# Patient Record
Sex: MALE | Race: WHITE | NOT HISPANIC OR LATINO | URBAN - METROPOLITAN AREA
[De-identification: names, ages, dates, MRNs, and addresses within clinical notes are randomized per-mention and may not be internally consistent; named-entity substitution may affect disease eponyms.]

---

## 2018-08-06 ENCOUNTER — OFFICE VISIT - HEALTHEAST (OUTPATIENT)
Dept: FAMILY MEDICINE | Facility: CLINIC | Age: 33
End: 2018-08-06

## 2018-08-06 DIAGNOSIS — J32.9 SINUSITIS: ICD-10-CM

## 2018-08-06 ASSESSMENT — MIFFLIN-ST. JEOR: SCORE: 1899.08

## 2018-08-22 ENCOUNTER — COMMUNICATION - HEALTHEAST (OUTPATIENT)
Dept: FAMILY MEDICINE | Facility: CLINIC | Age: 33
End: 2018-08-22

## 2018-08-24 ENCOUNTER — OFFICE VISIT - HEALTHEAST (OUTPATIENT)
Dept: FAMILY MEDICINE | Facility: CLINIC | Age: 33
End: 2018-08-24

## 2018-08-24 DIAGNOSIS — J01.01 ACUTE RECURRENT MAXILLARY SINUSITIS: ICD-10-CM

## 2021-06-01 VITALS — WEIGHT: 196.06 LBS | HEIGHT: 74 IN | BODY MASS INDEX: 25.16 KG/M2

## 2021-06-01 VITALS — BODY MASS INDEX: 26.1 KG/M2 | WEIGHT: 203.3 LBS

## 2021-06-19 NOTE — PROGRESS NOTES
DATE OF SERVICE: 08/06/2018    SUBJECTIVE:  A very pleasant 32-year-old male new to this clinic presents today for  a cough with nasal congestion and mucus for 3 weeks in duration of moderate  intensity.  This is accompanied by mild pain and fullness in both maxillary sinuses.   The patient notes that he has  also noted occasionally he gets short of breath and  he is coughing a fair amount.    REVIEW OF SYSTEMS:  All 12 systems were reviewed and are negative except for past  childhood history of asthma.    FAMILY HISTORY:  Notable for hypertension and heart disease in his father.  Socially  he does not smoke and he works locating underground lines.      OBJECTIVE:  VITAL SIGNS:  Blood pressure 120/60, pulse 84, respirations 18, temperature 97.3.  HEENT:  TMs erythematous.  Pain to palpation over the frontal maxillary sinuses  bilaterally.  Pharynx is mildly erythematous with postnasal drainage.  NECK:  Supple, with no lymphadenopathy or thyromegaly.  The heart has a regular  rhythm, normal S1, normal, S2 with no ankle edema.  LUNGS:  Clear to auscultation with no respiratory distress.  ABDOMEN:  Nontender.  There are no masses.  SKIN:  Pink and dry.  No rashes.    NEUROLOGIC:  No focal neurological deficits.  He moves all 4 extremities and pupils  are equal.    PSYCHIATRIC:  He is alert and conversant.  Has good memory, insight and judgment.    ASSESSMENT:  Sinusitis - acute.    PLAN:  1.  Augmentin 875 b.i.d. 10 days.  2.  Increased nasal irrigation.  3.  Increased rest.  4.  Return if not improving.      MD Charlene SHEN 08/06/2018 16:40:18  T 08/06/2018 20:32:20  R 08/06/2018 21:19:09  [id number]        cc:TOAN JIMÉNEZ MD

## 2021-06-20 NOTE — PROGRESS NOTES
Chief Complaint   Patient presents with     Sinusitis     Pt saw Dr Zaragoza recently for sinusitis and his sx have not resolved yet.        HPI: Patient presents today with complaints of continued issues with sinus pain, ear fullness, and a productive cough for the last month.  He was evaluated on 8/6/18 and given a prescription for Augmentin for 14 days.  He did notice some improvement in symptoms without complete eradication.  He recently went on an airplane trip and had significant bilateral ear pain secondary to pressure and popping along with right frontal and maxillary sinus discomfort as well.  He continues to feel fatigued.  No known history of seasonal allergies.    ROS:Review of Systems - History obtained from the patient  General ROS: positive for  - fatigue  Ophthalmic ROS: negative  ENT ROS: positive for - headaches, nasal congestion and sinus pain  Allergy and Immunology ROS: negative  Respiratory ROS: positive for - cough  Cardiovascular ROS: negative  Gastrointestinal ROS: negative    SH: The Patient's  reports that he has never smoked. He has never used smokeless tobacco.      FH: The Patient's family history is not on file.     Meds:    No current outpatient prescriptions on file prior to visit.     No current facility-administered medications on file prior to visit.        O:  /60 (Patient Site: Left Arm, Patient Position: Sitting, Cuff Size: Adult Large)  Pulse (!) 56  Temp 98  F (36.7  C)  Wt 203 lb 4.8 oz (92.2 kg)  BMI 26.1 kg/m2    Physical Examination:   General appearance - alert, well appearing, and in no distress  Mental status - alert, oriented to person, place, and time  Eyes - pupils equal and reactive, extraocular eye movements intact  Ears - bilateral TM's and external ear canals normal  Nose - normal and patent, no erythema, discharge or polyps  Mouth - mucous membranes moist, pharynx normal without lesions  Neck - supple, bilateral tonsillar adenopathy noted  Lymphatics -  palpable submandibular and tonsillar lymphadenopathy, no hepatosplenomegaly  Chest - clear to auscultation, no wheezes, rales or rhonchi, symmetric air entry  Heart - normal rate and regular rhythm, S1 and S2 normal, no murmurs noted    A/P:     Problem List Items Addressed This Visit     None      Visit Diagnoses     Acute recurrent maxillary sinusitis    -  Primary    Relevant Medications    levoFLOXacin (LEVAQUIN) 500 MG tablet    fluticasone (FLONASE) 50 mcg/actuation nasal spray            1. Acute recurrent maxillary sinusitis  Discussed stepwise approach to sinusitis.  Given the length of symptoms and only partial improvement, prescription sent in for levofloxacin.  We discussed risks and benefits including tendon rupture.  I also encouraged the patient to try fluticasone nasal spray along with saline nasal rinses and an over-the-counter antihistamine to see if there is an allergy component to this illness.  If he fails to improve despite all the measures listed, next steps include a referral to ENT.  All questions answered    - levoFLOXacin (LEVAQUIN) 500 MG tablet; Take 1 tablet (500 mg total) by mouth daily for 10 days.  Dispense: 10 tablet; Refill: 0  - fluticasone (FLONASE) 50 mcg/actuation nasal spray; 2 sprays into each nostril daily.  Dispense: 16 g; Refill: 12        Joseph Quintana CNP    This transcription was created utilizing voice recognition software and may contain typographical errors.